# Patient Record
Sex: FEMALE | Race: WHITE | NOT HISPANIC OR LATINO | Employment: FULL TIME | URBAN - METROPOLITAN AREA
[De-identification: names, ages, dates, MRNs, and addresses within clinical notes are randomized per-mention and may not be internally consistent; named-entity substitution may affect disease eponyms.]

---

## 2024-08-27 ENCOUNTER — APPOINTMENT (OUTPATIENT)
Dept: PRIMARY CARE | Facility: CLINIC | Age: 24
End: 2024-08-27
Payer: COMMERCIAL

## 2024-09-18 ENCOUNTER — APPOINTMENT (OUTPATIENT)
Dept: PRIMARY CARE | Facility: CLINIC | Age: 24
End: 2024-09-18
Payer: COMMERCIAL

## 2024-09-18 VITALS
DIASTOLIC BLOOD PRESSURE: 76 MMHG | BODY MASS INDEX: 26.1 KG/M2 | SYSTOLIC BLOOD PRESSURE: 110 MMHG | WEIGHT: 186.4 LBS | HEIGHT: 71 IN | HEART RATE: 60 BPM

## 2024-09-18 DIAGNOSIS — E66.3 OVERWEIGHT WITH BODY MASS INDEX (BMI) OF 26 TO 26.9 IN ADULT: ICD-10-CM

## 2024-09-18 DIAGNOSIS — R41.840 CONCENTRATION DEFICIT: Primary | ICD-10-CM

## 2024-09-18 DIAGNOSIS — G43.109 MIGRAINE WITH AURA AND WITHOUT STATUS MIGRAINOSUS, NOT INTRACTABLE: ICD-10-CM

## 2024-09-18 DIAGNOSIS — B00.9 HERPES SIMPLEX: ICD-10-CM

## 2024-09-18 PROCEDURE — 99203 OFFICE O/P NEW LOW 30 MIN: CPT | Performed by: STUDENT IN AN ORGANIZED HEALTH CARE EDUCATION/TRAINING PROGRAM

## 2024-09-18 PROCEDURE — 1036F TOBACCO NON-USER: CPT | Performed by: STUDENT IN AN ORGANIZED HEALTH CARE EDUCATION/TRAINING PROGRAM

## 2024-09-18 PROCEDURE — 3008F BODY MASS INDEX DOCD: CPT | Performed by: STUDENT IN AN ORGANIZED HEALTH CARE EDUCATION/TRAINING PROGRAM

## 2024-09-18 RX ORDER — VALACYCLOVIR HYDROCHLORIDE 500 MG/1
500 TABLET, FILM COATED ORAL 2 TIMES DAILY
Qty: 6 TABLET | Refills: 5 | Status: SHIPPED | OUTPATIENT
Start: 2024-09-18 | End: 2024-09-21

## 2024-09-18 RX ORDER — CETIRIZINE HYDROCHLORIDE 10 MG/1
10 TABLET ORAL DAILY
COMMUNITY

## 2024-09-18 RX ORDER — ACYCLOVIR 200 MG/1
CAPSULE ORAL DAILY PRN
COMMUNITY
End: 2024-09-18 | Stop reason: ALTCHOICE

## 2024-09-18 RX ORDER — BUPROPION HYDROCHLORIDE 150 MG/1
150 TABLET ORAL EVERY MORNING
Qty: 30 TABLET | Refills: 2 | Status: SHIPPED | OUTPATIENT
Start: 2024-09-18

## 2024-09-18 ASSESSMENT — ENCOUNTER SYMPTOMS
DIZZINESS: 0
MYALGIAS: 0
EYE REDNESS: 0
DECREASED CONCENTRATION: 1
DYSURIA: 0
ABDOMINAL PAIN: 0
FEVER: 0
COUGH: 0
DYSPHORIC MOOD: 0
PALPITATIONS: 0
RHINORRHEA: 0
HEADACHES: 0
NAUSEA: 0
CHILLS: 0
BLOOD IN STOOL: 0
SLEEP DISTURBANCE: 0
DIARRHEA: 0
NERVOUS/ANXIOUS: 0
SHORTNESS OF BREATH: 0
FLANK PAIN: 0
VOMITING: 0
CONSTIPATION: 0
ARTHRALGIAS: 0

## 2024-09-18 ASSESSMENT — PATIENT HEALTH QUESTIONNAIRE - PHQ9
SUM OF ALL RESPONSES TO PHQ9 QUESTIONS 1 AND 2: 0
1. LITTLE INTEREST OR PLEASURE IN DOING THINGS: NOT AT ALL
2. FEELING DOWN, DEPRESSED OR HOPELESS: NOT AT ALL

## 2024-09-18 NOTE — ASSESSMENT & PLAN NOTE
1 and 2 per pt report with genital outbreak currently. Will tx with Valtrex. Medication dosing and side effects reviewed. Return precautions reviewed.

## 2024-09-18 NOTE — ASSESSMENT & PLAN NOTE
With no formal ADHD diagnosis. We did briefly discuss options for ADHD tx if she receives formal diagnosis. At this time, we decided to trial Wellbutrin. Medication dosing and side effects reviewed. Return precautions reviewed.

## 2024-09-18 NOTE — PROGRESS NOTES
Subjective   Patient ID: Kayley Adams is a 24 y.o. female who presents for establish care. Hasn't seen pcp in a long time. Hx of migraines. Did get botox in neck and that did help.    HPI  Migraines - initially started in college, intermittent at that time, did well after college until this summer, flared again when she started semaglutide ([employer]) 7/2024, was having pain around R eye, caused blurry vision, nausea, no vomiting, felt LH, had stars in vision prior to onset of headache, daily for a week or 2, she then focused on staying hydrated and [facility], no migraines since that time    [facility], managed on semaglutide, has lost 20lb thus far, she is tolerating, states that she had labs done prior to starting the GLP1    HSV 1 and 2 - diagnosed about 8yr ago per pt report, was on suppressive antiviral therapy for some time but did not keep up with refills, has not taken in about 3yr, states that she is having genital flare currently, started last week, this is first flare in 8yrs    Focus - states was managed on Adipex for some time in the past, had significant improvement in focus and subsequent functioning at home and work while taking the medication, she struggles with time management and procrastination, has hard time processing thins in the moment, she does follow with a therapist virtually q1mo, states that ADHD but brought up as a possibility but has no formal diagnosis, there is no hx seizure    Cervical Cancer Screening: no hx abnormal, due  Breast Cancer Screening: paternal aunt with breast ca, plan to start mammo at age 38  Osteoporosis Screening: plan to start at age 63  Colon Cancer Screening: no fhx, asymptomatic, plan to start at age 43  Tobacco: denies  Alcohol: social  Recreational Drugs: rare gummies, otherwise denies  Immunizations: thinks utd, declines influenza    Review of Systems  "  Constitutional:  Negative for chills and fever.   HENT:  Negative for congestion and rhinorrhea.    Eyes:  Negative for redness.   Respiratory:  Negative for cough and shortness of breath.    Cardiovascular:  Negative for chest pain, palpitations and leg swelling.   Gastrointestinal:  Negative for abdominal pain, blood in stool, constipation, diarrhea, nausea and vomiting.   Genitourinary:  Negative for dysuria and flank pain.   Musculoskeletal:  Negative for arthralgias and myalgias.   Skin:  Negative for rash.   Neurological:  Negative for dizziness and headaches.   Psychiatric/Behavioral:  Positive for decreased concentration. Negative for dysphoric mood and sleep disturbance. The patient is not nervous/anxious.      Objective   /76   Pulse 60   Ht 1.803 m (5' 11\")   Wt 84.6 kg (186 lb 6.4 oz)   BMI 26.00 kg/m²     Physical Exam  Vitals reviewed.   Constitutional:       General: She is not in acute distress.     Appearance: Normal appearance.   HENT:      Head: Normocephalic and atraumatic.   Eyes:      General: No scleral icterus.     Conjunctiva/sclera: Conjunctivae normal.   Cardiovascular:      Rate and Rhythm: Normal rate and regular rhythm.      Heart sounds: No murmur heard.  Pulmonary:      Effort: Pulmonary effort is normal. No respiratory distress.      Breath sounds: Normal breath sounds.   Abdominal:      General: Bowel sounds are normal. There is no distension.      Palpations: Abdomen is soft.      Tenderness: There is no abdominal tenderness. There is no guarding or rebound.   Musculoskeletal:         General: No swelling or deformity.      Cervical back: Normal range of motion and neck supple.   Skin:     General: Skin is warm and dry.      Findings: No rash.   Neurological:      General: No focal deficit present.      Mental Status: She is alert.   Psychiatric:         Mood and Affect: Mood normal.         Behavior: Behavior normal.       Assessment/Plan   Problem List Items Addressed " This Visit             ICD-10-CM    Overweight with body mass index (BMI) of 26 to 26.9 in adult E66.3, Z68.26     Following at [facility]. Managed on semaglutide which she is tolerating. Down 20lb thus far.         Relevant Orders    Follow Up In Primary Care - Health Maintenance    Migraine with aura and without status migrainosus, not intractable G43.109     Exacerbated when she started semaglutide, but now resolved s/p botox therapy. Return precautions reviewed.          Herpes simplex B00.9     1 and 2 per pt report with genital outbreak currently. Will tx with Valtrex. Medication dosing and side effects reviewed. Return precautions reviewed.          Relevant Medications    valACYclovir (Valtrex) 500 mg tablet    Other Relevant Orders    Follow Up In Primary Care - Health Maintenance    Concentration deficit - Primary R41.840     With no formal ADHD diagnosis. We did briefly discuss options for ADHD tx if she receives formal diagnosis. At this time, we decided to trial Wellbutrin. Medication dosing and side effects reviewed. Return precautions reviewed.          Relevant Medications    buPROPion XL (Wellbutrin XL) 150 mg 24 hr tablet   Follow up in 6wk for physical/pap, sooner if needed. She will try to get copy of labs done at [facility] for our review.

## 2024-09-18 NOTE — ASSESSMENT & PLAN NOTE
Exacerbated when she started semaglutide, but now resolved s/p botox therapy. Return precautions reviewed.

## 2024-09-18 NOTE — ASSESSMENT & PLAN NOTE
Following at [facility]. Managed on semaglutide which she is tolerating. Down 20lb thus far.

## 2024-10-31 ENCOUNTER — APPOINTMENT (OUTPATIENT)
Dept: PRIMARY CARE | Facility: CLINIC | Age: 24
End: 2024-10-31
Payer: COMMERCIAL

## 2024-10-31 VITALS
WEIGHT: 180 LBS | HEIGHT: 71 IN | BODY MASS INDEX: 25.2 KG/M2 | DIASTOLIC BLOOD PRESSURE: 78 MMHG | SYSTOLIC BLOOD PRESSURE: 116 MMHG | HEART RATE: 68 BPM

## 2024-10-31 DIAGNOSIS — Z12.4 CERVICAL CANCER SCREENING: ICD-10-CM

## 2024-10-31 DIAGNOSIS — R41.840 CONCENTRATION DEFICIT: ICD-10-CM

## 2024-10-31 DIAGNOSIS — R30.0 DYSURIA: ICD-10-CM

## 2024-10-31 DIAGNOSIS — Z00.00 ROUTINE GENERAL MEDICAL EXAMINATION AT A HEALTH CARE FACILITY: Primary | ICD-10-CM

## 2024-10-31 LAB
POC APPEARANCE, URINE: CLEAR
POC BILIRUBIN, URINE: NEGATIVE
POC BLOOD, URINE: NEGATIVE
POC COLOR, URINE: YELLOW
POC GLUCOSE, URINE: NEGATIVE MG/DL
POC KETONES, URINE: NEGATIVE MG/DL
POC LEUKOCYTES, URINE: NEGATIVE
POC NITRITE,URINE: NEGATIVE
POC PH, URINE: 7.5 PH
POC PROTEIN, URINE: NEGATIVE MG/DL
POC SPECIFIC GRAVITY, URINE: 1.02
POC UROBILINOGEN, URINE: 1 EU/DL

## 2024-10-31 PROCEDURE — 3008F BODY MASS INDEX DOCD: CPT | Performed by: STUDENT IN AN ORGANIZED HEALTH CARE EDUCATION/TRAINING PROGRAM

## 2024-10-31 PROCEDURE — 99395 PREV VISIT EST AGE 18-39: CPT | Performed by: STUDENT IN AN ORGANIZED HEALTH CARE EDUCATION/TRAINING PROGRAM

## 2024-10-31 PROCEDURE — 81003 URINALYSIS AUTO W/O SCOPE: CPT | Performed by: STUDENT IN AN ORGANIZED HEALTH CARE EDUCATION/TRAINING PROGRAM

## 2024-10-31 PROCEDURE — 1036F TOBACCO NON-USER: CPT | Performed by: STUDENT IN AN ORGANIZED HEALTH CARE EDUCATION/TRAINING PROGRAM

## 2024-10-31 PROCEDURE — 88175 CYTOPATH C/V AUTO FLUID REDO: CPT

## 2024-10-31 RX ORDER — BUPROPION HYDROCHLORIDE 300 MG/1
300 TABLET ORAL EVERY MORNING
Qty: 90 TABLET | Refills: 1 | Status: SHIPPED | OUTPATIENT
Start: 2024-10-31

## 2024-10-31 ASSESSMENT — ENCOUNTER SYMPTOMS
COUGH: 0
FEVER: 0
NERVOUS/ANXIOUS: 0
CHILLS: 0
PALPITATIONS: 0
DYSPHORIC MOOD: 0
SHORTNESS OF BREATH: 0

## 2024-11-14 LAB
CYTOLOGY CMNT CVX/VAG CYTO-IMP: NORMAL
LAB AP HPV GENOTYPE QUESTION: YES
LAB AP HPV HR: NORMAL
LABORATORY COMMENT REPORT: NORMAL
LMP START DATE: NORMAL
PATH REPORT.TOTAL CANCER: NORMAL

## 2025-01-14 ENCOUNTER — TELEPHONE (OUTPATIENT)
Dept: PRIMARY CARE | Facility: CLINIC | Age: 25
End: 2025-01-14

## 2025-01-14 DIAGNOSIS — R30.0 DYSURIA: Primary | ICD-10-CM

## 2025-01-14 RX ORDER — SULFAMETHOXAZOLE AND TRIMETHOPRIM 800; 160 MG/1; MG/1
1 TABLET ORAL 2 TIMES DAILY
Qty: 6 TABLET | Refills: 0 | Status: SHIPPED | OUTPATIENT
Start: 2025-01-14 | End: 2025-01-17

## 2025-01-14 NOTE — TELEPHONE ENCOUNTER
Patient called in and said that she feels like she has a UTI coming on, she said hers are super painful and was wanting to know if she could get something called in for it?     Her symptoms are burning with urination and urgency to go all the time even after emptying.     Pharmacy: [facility] on [address]

## 2025-01-22 ENCOUNTER — APPOINTMENT (OUTPATIENT)
Dept: PRIMARY CARE | Facility: CLINIC | Age: 25
End: 2025-01-22
Payer: COMMERCIAL

## 2025-04-30 ENCOUNTER — APPOINTMENT (OUTPATIENT)
Dept: PRIMARY CARE | Facility: CLINIC | Age: 25
End: 2025-04-30
Payer: COMMERCIAL

## 2025-06-10 ENCOUNTER — APPOINTMENT (OUTPATIENT)
Dept: PRIMARY CARE | Facility: CLINIC | Age: 25
End: 2025-06-10

## 2025-06-10 VITALS
SYSTOLIC BLOOD PRESSURE: 104 MMHG | HEART RATE: 68 BPM | HEIGHT: 71 IN | WEIGHT: 161.36 LBS | BODY MASS INDEX: 22.59 KG/M2 | DIASTOLIC BLOOD PRESSURE: 70 MMHG

## 2025-06-10 DIAGNOSIS — H61.92 EARLOBE LESION, LEFT: Primary | ICD-10-CM

## 2025-06-10 PROBLEM — R30.0 DYSURIA: Status: RESOLVED | Noted: 2024-10-31 | Resolved: 2025-06-10

## 2025-06-10 PROCEDURE — 3008F BODY MASS INDEX DOCD: CPT | Performed by: STUDENT IN AN ORGANIZED HEALTH CARE EDUCATION/TRAINING PROGRAM

## 2025-06-10 PROCEDURE — 1036F TOBACCO NON-USER: CPT | Performed by: STUDENT IN AN ORGANIZED HEALTH CARE EDUCATION/TRAINING PROGRAM

## 2025-06-10 PROCEDURE — 99213 OFFICE O/P EST LOW 20 MIN: CPT | Performed by: STUDENT IN AN ORGANIZED HEALTH CARE EDUCATION/TRAINING PROGRAM

## 2025-06-10 NOTE — PROGRESS NOTES
"Subjective   Patient ID: Kayley Adams is a 24 y.o. female who presents for a follow up, complains of a lesion on the ear and discuss removal.    HPI  Concentration - stopped Wellbutrin about a month after last appt, states that she started feeling irritable with it, has felt well since she stopped the medication, states that she is no longer having trouble with concentration    BMI - managed on GLP1, she is down about 50lb and feeling great, looking forward to her wedding later this fall    L ear - was pierced age 1, states that she had small bump in the ear lobe and the hole eventually closed, states that the bump started getting bigger about 4mo ago, she is interested in getting the lesion removed    Cervical Cancer Screening: pap neg 10/31/2024, due 10/2027  Breast Cancer Screening: paternal aunt with breast ca, plan to start mammo at age 38  Osteoporosis Screening: plan to start at age 63  Colon Cancer Screening: no fhx, asymptomatic, plan to start at age 43  Tobacco: denies  Alcohol: social  Recreational Drugs: rare gummies, otherwise denies  Immunizations: thinks utd, declines influenza    Review of Systems   Constitutional:  Negative for chills and fever.   Respiratory:  Negative for cough and shortness of breath.    Cardiovascular:  Negative for chest pain and palpitations.   Gastrointestinal:  Negative for abdominal pain, constipation, diarrhea, nausea and vomiting.   Skin:  Negative for rash.        Spot left earlobe   Psychiatric/Behavioral:  Negative for dysphoric mood. The patient is not nervous/anxious.      Objective   /70   Pulse 68   Ht 1.803 m (5' 11\")   Wt 73.2 kg (161 lb 5.8 oz)   BMI 22.51 kg/m²     Physical Exam  Constitutional:       Appearance: Normal appearance.   HENT:      Head: Normocephalic and atraumatic.   Eyes:      General: No scleral icterus.     Conjunctiva/sclera: Conjunctivae normal.   Cardiovascular:      Rate and Rhythm: Normal rate and regular rhythm.      Heart " sounds: No murmur heard.  Pulmonary:      Effort: Pulmonary effort is normal. No respiratory distress.      Breath sounds: Normal breath sounds.   Musculoskeletal:         General: No swelling. Normal range of motion.      Cervical back: Normal range of motion and neck supple.   Skin:     General: Skin is warm and dry.      Findings: Lesion (5mm scabbed lesion L earlobe) present. No rash.   Neurological:      General: No focal deficit present.      Mental Status: She is alert.   Psychiatric:         Mood and Affect: Mood normal.         Behavior: Behavior normal.       Assessment/Plan   Problem List Items Addressed This Visit           ICD-10-CM    Earlobe lesion, left - Primary H61.92    - Increasing in size x4mo  - Using shared decision making, we decided to proceed with plastic surgery evaluation for consideration of removal         Relevant Orders    Referral to Plastic Surgery    Follow Up In Primary Care - Health Maintenance   Follow up in 1yr for recheck, sooner if needed.

## 2025-06-15 PROBLEM — H61.92 EARLOBE LESION, LEFT: Status: ACTIVE | Noted: 2025-06-15

## 2025-06-15 PROBLEM — E66.3 OVERWEIGHT WITH BODY MASS INDEX (BMI) OF 26 TO 26.9 IN ADULT: Status: RESOLVED | Noted: 2024-09-18 | Resolved: 2025-06-15

## 2025-06-15 PROBLEM — R41.840 CONCENTRATION DEFICIT: Status: RESOLVED | Noted: 2024-09-18 | Resolved: 2025-06-15

## 2025-06-15 ASSESSMENT — ENCOUNTER SYMPTOMS
CONSTIPATION: 0
FEVER: 0
SHORTNESS OF BREATH: 0
DIARRHEA: 0
DYSPHORIC MOOD: 0
NERVOUS/ANXIOUS: 0
PALPITATIONS: 0
CHILLS: 0
VOMITING: 0
ABDOMINAL PAIN: 0
NAUSEA: 0
COUGH: 0

## 2025-06-15 NOTE — ASSESSMENT & PLAN NOTE
- Increasing in size x4mo  - Using shared decision making, we decided to proceed with plastic surgery evaluation for consideration of removal

## 2026-06-16 ENCOUNTER — APPOINTMENT (OUTPATIENT)
Dept: PRIMARY CARE | Facility: CLINIC | Age: 26
End: 2026-06-16
Payer: COMMERCIAL